# Patient Record
Sex: FEMALE | Race: WHITE | NOT HISPANIC OR LATINO | Employment: UNEMPLOYED | URBAN - METROPOLITAN AREA
[De-identification: names, ages, dates, MRNs, and addresses within clinical notes are randomized per-mention and may not be internally consistent; named-entity substitution may affect disease eponyms.]

---

## 2022-02-19 ENCOUNTER — HOSPITAL ENCOUNTER (EMERGENCY)
Facility: CLINIC | Age: 42
Discharge: HOME | End: 2022-02-19
Attending: EMERGENCY MEDICINE | Admitting: EMERGENCY MEDICINE

## 2022-02-19 VITALS
OXYGEN SATURATION: 99 % | SYSTOLIC BLOOD PRESSURE: 120 MMHG | HEIGHT: 67 IN | DIASTOLIC BLOOD PRESSURE: 69 MMHG | RESPIRATION RATE: 16 BRPM | WEIGHT: 185 LBS | BODY MASS INDEX: 29.03 KG/M2 | HEART RATE: 80 BPM

## 2022-02-19 DIAGNOSIS — F22 PARANOIA (H): ICD-10-CM

## 2022-02-19 LAB — SARS-COV-2 RNA RESP QL NAA+PROBE: NEGATIVE

## 2022-02-19 PROCEDURE — 250N000013 HC RX MED GY IP 250 OP 250 PS 637: Performed by: EMERGENCY MEDICINE

## 2022-02-19 PROCEDURE — 90791 PSYCH DIAGNOSTIC EVALUATION: CPT

## 2022-02-19 PROCEDURE — 99285 EMERGENCY DEPT VISIT HI MDM: CPT | Mod: 25

## 2022-02-19 PROCEDURE — C9803 HOPD COVID-19 SPEC COLLECT: HCPCS

## 2022-02-19 PROCEDURE — 87635 SARS-COV-2 COVID-19 AMP PRB: CPT | Performed by: EMERGENCY MEDICINE

## 2022-02-19 RX ORDER — OLANZAPINE 5 MG/1
5 TABLET, ORALLY DISINTEGRATING ORAL
Status: DISCONTINUED | OUTPATIENT
Start: 2022-02-19 | End: 2022-02-19 | Stop reason: HOSPADM

## 2022-02-19 RX ORDER — ACETAMINOPHEN 500 MG
1000 TABLET ORAL ONCE
Status: COMPLETED | OUTPATIENT
Start: 2022-02-19 | End: 2022-02-19

## 2022-02-19 RX ADMIN — ACETAMINOPHEN 1000 MG: 500 TABLET ORAL at 06:13

## 2022-02-19 ASSESSMENT — ENCOUNTER SYMPTOMS
FEVER: 0
VOMITING: 0
COUGH: 0
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
## 2022-02-19 NOTE — ED TRIAGE NOTES
Pt walks in to ED with vague complaint. Pt admits to fleeting SI w/o plan. Pt admits to marijuana and vodka tonight. Pt is paranoid.

## 2022-02-19 NOTE — DISCHARGE INSTRUCTIONS
Connect with your psychiatry provider to restart medications that you identified are helpful to be on.     Continue engaging in treatment with [facility] and attending groups.     If I am feeling unsafe or I am in a crisis, I will:   Contact my established care providers   Call the National Suicide Prevention Lifeline: 611.745.7003   Go to the nearest emergency room   Call 911     Warning signs that I or other people might notice when a crisis is developing for me: not sleeping at night, going to the casino, hyperverbal  Things I am able to do on my own to cope or help me feel better: beadwork, listen to music, crafts, count to 10, grounding, mindfulness   Things that I am able to do with others to cope or help me better: play games, watch TV/movies, be a good friend   Things I can use or do for distraction: all of the above   Changes I can make to support my mental health and wellness: restart medications   People in my life that I can ask for help: Annetta (sister), peers at treatment center   Your [facility] has a mental health crisis team you can call 24/7: [facility] Crisis Line Number: [phone]    Other things that are important when I m in crisis: be kind, be empathetic     Crisis Lines  Crisis Text Line  Text 892532  You will be connected with a trained live crisis counselor to provide support.  Gambling Hotline  1.204.333.hope [4673]  National Hope Line  1.800.SUICIDE [9381778]  National Suicide Prevention Lifeline  Free and confidential support  1.901.129.TALK [2259]  http://suicidepreventionlifeline.org  The [place] Project (LGBTQ Youth Crisis Line)  2.342.239.3470  text START to 478-673  's Crisis Line  2.625.952.2711 (Press 1)  or text 034920    Community Resources  Fast Tracker  Linking people to mental health and substance use disorder resources  SIVIn.org   [place] Mental Health Warm Line  Peer to peer support  Monday thru Saturday, 12 pm to 10 pm  888.622.8824 or  "3.503.986.1620  Text \"Support\" to 06956  National [place] on Mental Illness (AMY)  045.118.1681 or 1.888.AMY.HELPS  Walk-in Counseling Center  Free mental health counseling  [facility]  [phone]    Mental Health Apps  My3  https://VibeSec.eventblimp/  VirtualHopeBox  https://Techstars/apps/virtual-hope-box/  Suicide Safety Plan (Recipharm)  Calm Harm    "

## 2022-02-19 NOTE — ED NOTES
This patient is a 42-year-old female who was signed out to me to follow-up on our DEC assessment.  They were able to identify some paranoid thinking but ultimately forward thinking.  The patient did admit to stopping her medication 2 months ago and seems to acknowledge that she needs to restart this.  She is hoping to go back to her group housing that is attached to a treatment facility so she can complete her treatment.  There is no SI, HI, or other pathology that should prevent her from doing so and our mental health  feels that she is safe for discharge.  MD Janis Saravia, Marylou Ovalle MD  02/19/22 2895
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
## 2022-02-19 NOTE — ED NOTES
Pt on phone with sister, sister agreed to get Pt. Discharge instructions went over with Pt and Pt walked out to lobby with RN.